# Patient Record
Sex: FEMALE | Race: WHITE | NOT HISPANIC OR LATINO | URBAN - METROPOLITAN AREA
[De-identification: names, ages, dates, MRNs, and addresses within clinical notes are randomized per-mention and may not be internally consistent; named-entity substitution may affect disease eponyms.]

---

## 2022-06-17 ENCOUNTER — TELEPHONE (OUTPATIENT)
Dept: SCHEDULING | Facility: CLINIC | Age: 57
End: 2022-06-17
Payer: COMMERCIAL

## 2022-06-17 NOTE — TELEPHONE ENCOUNTER
New Patient Appointment Request    Name of caller: Froy    Reason for Visit: high cholesterol    Insurance: Amanda    Insurance ID #: [id number]    Recent Procedures: none    Referred by: PCP Oral Rollins    Previous Cardiologist name and phone number: none    Best contact number: [phone]    Additional notes: would like appt towards the end of July

## 2022-09-12 NOTE — PROGRESS NOTES
Michael Hare MD  Cardiology    [facility]    [facility]  [facility] [address]  [address] [place] [address]    TEL  [phone]  [facility]     Advanced Lipid Clinic     09/14/22     Dear Dr. Rollins:    It was my pleasure to see Froy Saldaña in the Advanced Lipid Clinic today. She was referred for the evaluation and management of hypercholesterolemia.     As you know, she is a 56-year-old woman with a past medical history of hypercholesterolemia, diabetes mellitus, and hypothyroidism.    She reports that historically her cholesterol values have been reasonably controlled.  Following menopause her cholesterol values have started to increase and her glycemic control has worsened.  Following her most recent lab work which showed further elevation in her cholesterol, she met with a nutritionist and has made significant dietary changes.  She is exercising more frequently.  She reports that she has gained significant weight over the last several years.  She has never been on lipid-lowering therapy and prefers to avoid medication if at all possible.  She reports no new cardiopulmonary concerns or complaints.  No chest pain or pressure.  She has mild dyspnea with significant exertion which she relates to deconditioning.  No lower extremity edema, orthopnea, or PND.  No syncope.  She has no history of liver disease.  No history of preeclampsia or gestational diabetes.    Past Medical History: Hypothyroidism, prediabetes, hypercholesterolemia. Reports Lyme diagnosis at 41 years old.     Past Surgical History:  No surgery in the past. Colonoscopy at 50s.     Medications:  Current Outpatient Medications   Medication Sig Dispense Refill    FISH OIL-omega-3 fatty acids (FISH OIL) 340-1,000 mg capsule Take by mouth daily.      INOSITOL ORAL Take by mouth. 1-2 capsules twice daily      levothyroxine (SYNTHROID) 100 mcg tablet Take 100 mcg by mouth daily.       NOT IN DATABASE Met[employer] phytomulti      NOT IN DATABASE orthomolecular lipoic acid 1 capsule daily      NOT IN DATABASE orthomolecular lipoic reacted magnesium      soybean, fermented (NATTOKINASE ORAL) Take by mouth. 1 capsule daily      traZODone (DESYREL) 50 mg tablet Take 50 mg by mouth nightly.      ubidecarenone/vitamin E mixed (COQ10  ORAL) Take by mouth. 400 mg daily      VITAMIN B COMPLEX ORAL Take by mouth once daily.      VITAMIN K2 ORAL Take by mouth.       No current facility-administered medications for this visit.       Allergies: No medication allergy. Pollen allergy     Social History: She is currently working at her 's [employer] business.  Her last menstrual cycle was 4 years ago. She lives with her .  She has 3 children.  They grew up [place] [employer].  No smoking, alcohol, or illicit drugs.     Family History:   Father had MI at 63 and passed away at 70 years old. Mother has hypertension.   No known history of premature coronary artery disease.     Review of Systems: A complete 14-point review of systems is negative, except as noted in the HPI.    Exam:  Objective    Vitals:    09/14/22 0913   BP: 108/72   Pulse: 78   SpO2: 96%   Manual BP: 110/70mmHg    Constitutional: Appears comfortable.   Eyes: No icterus. No corneal arcus.   ENT: Deferred. Patient wearing a mask.  Neck: No jugular venous distention.   Vascular: No carotid bruits. Distal pulses intact and equal.  Cardiac: Normal S1 and S2, regular rate and rhythm. No murmurs, rubs, or gallops appreciated.  Lungs: Clear to auscultation bilaterally.   GI: Soft, normoactive bowel sounds.  Extremities: Warm. No lower extremity edema.   Skin: Dry.   Musculoskeletal: No joint swelling or erythema.  Neurologic: Awake, alert, oriented.    Psychiatric: No agitation.    Weight:  Wt Readings from Last 3 Encounters:   09/14/22 70.3 kg (155 lb)       Lipid panel: I personally reviewed the patient's  previous lipid results.  They were discussed with the patient.  7/2022:  TSH within normal limits    5/2022:  Sodium 137, potassium 4.2, BUN 23, creatinine 1.22    3/3/2022:  Sodium 138, potassium 4.0, BUN 20, creatinine 1.14  LFTs within normal limits  , TG 67, HDL 67,   Hemoglobin A1c 6.1%    11/2021:  Hemoglobin A1c 6.7%    7/2021:  , TG 50, HDL 72,     EKG: Tracing from today personally reviewed and discussed with the patient.  Sinus bradycardia at 56/min. No prior study available for comparison.        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  Historically she reports well-controlled cholesterol.  It is only recently that her values have increased.  Her LDL was 166 last year.  She has made significant lifestyle changes following her most recent lab values.  We discussed the role of statin therapy to reduce cardiovascular risk.  I reviewed the most recent cholesterol guidelines.  She has a strong preference to avoid medication.  Given her preferences on medication and historically reasonably controlled cholesterol we will obtain an advanced lipid panel as well as a coronary calcium score before making any decisions on pharmacotherapy.  We discussed continued lifestyle changes at length today.      Diabetes:  This is a recent diagnosis.  She has made significant lifestyle modifications recently.  She will follow-up with you for continued management.  Lipid-lowering as above.    Hypothyroidism   Most recent TSH within normal limits.  She continues to follow with you for management.    Chronic kidney disease  Recent diagnosis.  She will continue to follow with you for management.  Blood pressure is well controlled.      It was my pleasure to visit with Froy Saldaña in clinic today.  She will follow-up with me in 4 months.  Please do not hesitate to contact me with any questions.      Sincerely,          ____________  Michael Hare MD      Patient was seen with medical resident, Dr. Salazar.

## 2022-09-14 ENCOUNTER — OFFICE VISIT (OUTPATIENT)
Dept: CARDIOLOGY | Facility: CLINIC | Age: 57
End: 2022-09-14
Payer: COMMERCIAL

## 2022-09-14 VITALS
OXYGEN SATURATION: 96 % | BODY MASS INDEX: 25.83 KG/M2 | WEIGHT: 155 LBS | DIASTOLIC BLOOD PRESSURE: 72 MMHG | HEART RATE: 78 BPM | HEIGHT: 65 IN | SYSTOLIC BLOOD PRESSURE: 108 MMHG

## 2022-09-14 DIAGNOSIS — E03.9 HYPOTHYROIDISM, UNSPECIFIED TYPE: ICD-10-CM

## 2022-09-14 DIAGNOSIS — E78.00 HYPERCHOLESTEROLEMIA: Primary | ICD-10-CM

## 2022-09-14 DIAGNOSIS — N18.9 CHRONIC KIDNEY DISEASE, UNSPECIFIED CKD STAGE: ICD-10-CM

## 2022-09-14 DIAGNOSIS — E11.9 TYPE 2 DIABETES MELLITUS WITHOUT COMPLICATION, WITHOUT LONG-TERM CURRENT USE OF INSULIN (CMS/HCC): ICD-10-CM

## 2022-09-14 PROCEDURE — 93000 ELECTROCARDIOGRAM COMPLETE: CPT | Performed by: INTERNAL MEDICINE

## 2022-09-14 PROCEDURE — 3008F BODY MASS INDEX DOCD: CPT | Performed by: INTERNAL MEDICINE

## 2022-09-14 PROCEDURE — 99204 OFFICE O/P NEW MOD 45 MIN: CPT | Mod: GC | Performed by: INTERNAL MEDICINE

## 2022-09-14 RX ORDER — TRAZODONE HYDROCHLORIDE 50 MG/1
50 TABLET ORAL NIGHTLY
COMMUNITY
End: 2023-01-27

## 2022-09-14 RX ORDER — LEVOTHYROXINE SODIUM 100 UG/1
100 TABLET ORAL
COMMUNITY
End: 2023-01-27

## 2022-12-19 ENCOUNTER — APPOINTMENT (OUTPATIENT)
Dept: LAB | Age: 57
End: 2022-12-19
Attending: INTERNAL MEDICINE
Payer: COMMERCIAL

## 2022-12-19 ENCOUNTER — HOSPITAL ENCOUNTER (OUTPATIENT)
Dept: RADIOLOGY | Age: 57
Discharge: HOME | End: 2022-12-19
Attending: INTERNAL MEDICINE

## 2022-12-19 DIAGNOSIS — E78.00 PURE HYPERCHOLESTEROLEMIA, UNSPECIFIED: ICD-10-CM

## 2022-12-19 DIAGNOSIS — E78.5 HYPERLIPIDEMIA, UNSPECIFIED: ICD-10-CM

## 2022-12-19 DIAGNOSIS — E78.00 HYPERCHOLESTEROLEMIA: ICD-10-CM

## 2022-12-19 PROCEDURE — G1004 CDSM NDSC: HCPCS

## 2022-12-19 PROCEDURE — 30099997 SPECIMEN PROCESSING

## 2023-01-05 ENCOUNTER — DOCUMENTATION (OUTPATIENT)
Dept: CARDIOLOGY | Facility: CLINIC | Age: 58
End: 2023-01-05
Payer: COMMERCIAL

## 2023-01-09 LAB — HBA1C MFR BLD HPLC: 7.1 %

## 2023-01-27 ENCOUNTER — OFFICE VISIT (OUTPATIENT)
Dept: CARDIOLOGY | Facility: CLINIC | Age: 58
End: 2023-01-27
Payer: COMMERCIAL

## 2023-01-27 VITALS
SYSTOLIC BLOOD PRESSURE: 106 MMHG | HEIGHT: 64 IN | BODY MASS INDEX: 25.78 KG/M2 | DIASTOLIC BLOOD PRESSURE: 68 MMHG | HEART RATE: 68 BPM | RESPIRATION RATE: 18 BRPM | WEIGHT: 151 LBS | OXYGEN SATURATION: 98 %

## 2023-01-27 DIAGNOSIS — E78.00 HYPERCHOLESTEROLEMIA: Primary | ICD-10-CM

## 2023-01-27 DIAGNOSIS — E03.9 HYPOTHYROIDISM, UNSPECIFIED TYPE: ICD-10-CM

## 2023-01-27 DIAGNOSIS — E11.9 TYPE 2 DIABETES MELLITUS WITHOUT COMPLICATION, WITHOUT LONG-TERM CURRENT USE OF INSULIN (CMS/HCC): ICD-10-CM

## 2023-01-27 DIAGNOSIS — N18.9 CHRONIC KIDNEY DISEASE, UNSPECIFIED CKD STAGE: ICD-10-CM

## 2023-01-27 PROCEDURE — 99214 OFFICE O/P EST MOD 30 MIN: CPT | Performed by: INTERNAL MEDICINE

## 2023-01-27 PROCEDURE — 3008F BODY MASS INDEX DOCD: CPT | Performed by: INTERNAL MEDICINE

## 2023-01-27 NOTE — PROGRESS NOTES
Michael Hare MD  Cardiology    [facility]    [facility]  [facility] [address]  [address] [place] [address]    TEL  [phone]  [facility]     Advanced Lipid Clinic     01/27/23     Dear Dr. Rollins:    It was my pleasure to see Froy Saldaña in the Advanced Lipid Clinic today.  She is accompanied by her  who provided additional history.    As you know, she is a 57-year-old woman with a past medical history of hypercholesterolemia, diabetes mellitus, and hypothyroidism.    She has done well overall since her last visit with me.  She has made significant dietary changes.  She exercises regularly.  She continues to deny any cardiopulmonary symptoms at rest or with activity.  Much of today's visit was spent reviewing her advanced lipid panel as well as her calcium score.  She has no specific concerns or complaints in the office.    Past Medical History: Hypothyroidism, diabetes, hypercholesterolemia. Reports Lyme diagnosis at 41 years old.     Past Surgical History:  No surgery in the past. Colonoscopy at 50s.     Medications:  Current Outpatient Medications   Medication Sig Dispense Refill   • FISH OIL-omega-3 fatty acids (FISH OIL) 340-1,000 mg capsule Take by mouth daily.     • INOSITOL ORAL Take by mouth. 1-2 capsules twice daily     • NOT IN DATABASE Metagenics phytomulti     • NOT IN DATABASE orthomolecular lipoic acid 1 capsule daily     • NOT IN DATABASE orthomolecular lipoic reacted magnesium     • soybean, fermented (NATTOKINASE ORAL) Take by mouth. 1 capsule daily     • ubidecarenone/vitamin E mixed (COQ10  ORAL) Take by mouth. 400 mg daily     • VITAMIN B COMPLEX ORAL Take by mouth once daily.     • VITAMIN K2 ORAL Take by mouth.       No current facility-administered medications for this visit.       Allergies: No medication allergy. 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  Her last menstrual cycle was 4 years ago. She lives with her .  She has 3 children.  They grew up skiing at [employer].  No smoking, alcohol, or illicit drugs.     Family History:   Father had MI at 63 and passed away at 70 years old. Mother has hypertension.   No known history of premature coronary artery disease.     Review of Systems: A complete 14-point review of systems is negative, except as noted in the HPI.    Exam:  Objective    Vitals:    01/27/23 0917   BP: 106/68   Pulse: 68   Resp: 18   SpO2: 98%     Constitutional: Appears comfortable.   Eyes: No icterus. No corneal arcus.   ENT: Deferred. Patient wearing a mask.  Neck: No jugular venous distention.   Vascular: No carotid bruits.  Cardiac: Normal S1 and S2, regular rate and rhythm. No murmurs, rubs, or gallops appreciated.  Lungs: Clear to auscultation bilaterally.   GI: Soft, normoactive bowel sounds.  Extremities: Warm. No lower extremity edema.   Skin: Dry.   Neurologic: Awake, alert, oriented.    Psychiatric: No agitation.    Weight:  Wt Readings from Last 3 Encounters:   01/27/23 68.5 kg (151 lb)   09/14/22 70.3 kg (155 lb)       Lipid panel: I personally reviewed the patient's previous lipid results.  They were discussed with the patient.  12/2022:  , TG 50, HDL, 86,   ApoB 102  Lp(a) <15  Borderline production and high absorption  hsCRP 4.2  A1c 7.2%  LDL-P 1475, LDL size 20.8  Fatty acide index 9.28    7/2022:  TSH within normal limits    5/2022:  Sodium 137, potassium 4.2, BUN 23, creatinine 1.22    3/3/2022:  Sodium 138, potassium 4.0, BUN 20, creatinine 1.14  LFTs within normal limits  , TG 67, HDL 67,   Hemoglobin A1c 6.1%    11/2021:  Hemoglobin A1c 6.7%    7/2021:  , TG 50, HDL 72,     Pertinent Studies:  · CAC, 12/2022: Composite 0, aorta unremarkable.    Impressions and Recommendations:     Hypercholesterolemia:  Ms. Saldaña's advanced cholesterol testing was reviewed  in detail.  We discussed the coronary calcium score data in diabetic patients.  I reviewed that by guidelines statin therapy should be prescribed to reduce her long-term cardiovascular risk.  This was still my recommendation.  She would like to think about this further before making any decisions.  She will follow-up with me once she has made a decision.  If she decides not to proceed with statin therapy I would plan to repeat her calcium score in 3 years.  She will continue to work on lifestyle change.    Diabetes:  She has made significant lifestyle modifications.  She will follow-up with you for continued management.  Lipid-lowering as above.    Hypothyroidism   She continues to follow with you for management.    Chronic kidney disease  She will continue to follow with you for management.  Blood pressure is well controlled.      It was my pleasure to visit with Froy Saldaña in clinic today.  She will follow-up with me in 12 months.  Please do not hesitate to contact me with any questions.      Sincerely,         ____________  Michael Hare MD    I spent 30 minutes on this date of service performing the following activities: obtaining history, performing examination, documenting, preparing for visit, obtaining / reviewing records and providing counseling and education.

## 2023-07-19 ENCOUNTER — OFFICE VISIT (OUTPATIENT)
Dept: GASTROENTEROLOGY | Facility: CLINIC | Age: 58
End: 2023-07-19
Payer: COMMERCIAL

## 2023-07-19 ENCOUNTER — TELEPHONE (OUTPATIENT)
Dept: GASTROENTEROLOGY | Facility: CLINIC | Age: 58
End: 2023-07-19

## 2023-07-19 VITALS
SYSTOLIC BLOOD PRESSURE: 118 MMHG | HEIGHT: 64 IN | BODY MASS INDEX: 24.24 KG/M2 | WEIGHT: 142 LBS | DIASTOLIC BLOOD PRESSURE: 80 MMHG

## 2023-07-19 DIAGNOSIS — R05.3 CHRONIC COUGH: Primary | ICD-10-CM

## 2023-07-19 DIAGNOSIS — Z12.11 SCREENING FOR COLON CANCER: ICD-10-CM

## 2023-07-19 PROCEDURE — 99214 OFFICE O/P EST MOD 30 MIN: CPT | Performed by: INTERNAL MEDICINE

## 2023-07-19 RX ORDER — COLESEVELAM 180 1/1
TABLET ORAL
COMMUNITY
Start: 2023-06-19

## 2023-07-19 RX ORDER — PERPHENAZINE 16 MG/1
TABLET, FILM COATED ORAL DAILY
COMMUNITY
Start: 2023-05-17

## 2023-07-19 RX ORDER — TRAZODONE HYDROCHLORIDE 50 MG/1
TABLET ORAL
COMMUNITY
Start: 2023-07-08

## 2023-07-19 RX ORDER — LANCETS
EACH MISCELLANEOUS
COMMUNITY
Start: 2023-05-17

## 2023-07-19 RX ORDER — CALCIUM CARBONATE/VITAMIN D3 500-10/5ML
LIQUID (ML) ORAL 3 TIMES DAILY
COMMUNITY

## 2023-07-19 RX ORDER — LEVOTHYROXINE SODIUM 112 UG/1
112 TABLET ORAL DAILY
COMMUNITY
Start: 2023-04-20

## 2023-07-19 NOTE — PROGRESS NOTES
[facility] - Outpatient Follow-up Note  Dedrick Maxwell 56 y.o. female MRN: [id number]  Encounter: [id number]    ASSESSMENT AND PLAN:      1. Chronic cough  About 1 year of gagging and coughing occurring after brushing her teeth. Does not occur while brushing, only happens when she tries to spit toothpaste  Intermittent issues with cough when swallowing water  Reviewed differential diagnosis which includes extra- esophageal manifestation of reflux, Zenker's diverticulum, dysmotility, oropharyngeal dysphagia. Reviewed treatment options. We will start with upper endoscopy. If unrevealing we discussed barium swallow , speech therapy evaluation, or motility testing. 2. Screening for colon cancer  Hyperplastic polyp January 2018, 10-year recall    Followup Appointment: Pending EGD  ______________________________________________________________________    Chief Complaint   Patient presents with   • gagging/coughing     Primarily occurs when pt is finished brushing her teeth she will gag and cough     HPI: The patient presents accompanied by her daughter with complaint of gagging and coughing present for about 1 year. Occurs after brushing her teeth when trying to spit out the toothpaste. It occurs every time but is more pronounced in the morning. She intermittently has issues with water "going down the wrong pipe" which predates this coughing. She recently lost about 20 pounds intentionally due to elevated cholesterol and fasting sugar. With this weight loss her GI complaints are less pronounced. She denies any celeste dysphagia to solids. She denies any classic reflux complaints. She has no nausea or vomiting. She has no lower GI complaints.     Historical Information   Past Medical History:   Diagnosis Date   • Lyme disease      Past Surgical History:   Procedure Laterality Date   • COLONOSCOPY       Social History     Substance and Sexual Activity   Alcohol Use None Social History     Substance and Sexual Activity   Drug Use Not on file     Social History     Tobacco Use   Smoking Status Never   Smokeless Tobacco Never     Family History   Problem Relation Age of Onset   • No Known Problems Mother    • No Known Problems Father    • No Known Problems Sister    • No Known Problems Brother          Current Outpatient Medications:   •  colesevelam (WELCHOL) 625 mg tablet  •  Contour Next Test test strip  •  levothyroxine 112 mcg tablet  •  Magnesium Oxide 400 MG CAPS  •  Microlet Lancets MISC  •  traZODone (DESYREL) 50 mg tablet  No Known Allergies  Reviewed medications and allergies and updated as indicated    PHYSICAL EXAM:    Blood pressure 118/80, height 5' 4" (1.626 m), weight 64.4 kg (142 lb). Body mass index is 24.37 kg/m². General Appearance: NAD, cooperative, alert  Eyes: Anicteric, PERRLA, EOMI  ENT:  Normocephalic, atraumatic, normal mucosa. Neck:  Supple, symmetrical, trachea midline  Resp:  Clear to auscultation bilaterally; no rales, rhonchi or wheezing; respirations unlabored   CV:  S1 S2, Regular rate and rhythm; no murmur, rub, or gallop. GI:  Soft, non-tender, non-distended; normal bowel sounds; no masses, no organomegaly   Rectal: Deferred  Musculoskeletal: No cyanosis, clubbing or edema. Normal ROM. Skin:  No jaundice, rashes, or lesions   Heme/Lymph: No palpable cervical lymphadenopathy  Psych: Normal affect, good eye contact  Neuro: No gross deficits, AAOx3    Lab Results:   No results found for: "WBC", "HGB", "HCT", "MCV", "PLT"  No results found for: "NA", "K", "CL", "CO2", "ANIONGAP", "BUN", "CREATININE", "GLUCOSE", "GLUF", "CALCIUM", "CORRECTEDCA", "AST", "ALT", "ALKPHOS", "PROT", "BILITOT", "EGFR"  No results found for: "IRON", "TIBC", "FERRITIN"  No results found for: "LIPASE"    Radiology Results:   No results found.

## 2023-07-19 NOTE — TELEPHONE ENCOUNTER
Scheduled date of EGD(as of today):8-16-23  Physician performing EGD:Emre  Location of EGD:[facility]  Instructions reviewed with patient by:AVE  Clearances: no

## 2023-07-19 NOTE — H&P (VIEW-ONLY)
[facility] - Outpatient Follow-up Note  Roxann Sprague 56 y.o. female MRN: [id number]  Encounter: [id number]    ASSESSMENT AND PLAN:      1. Chronic cough  About 1 year of gagging and coughing occurring after brushing her teeth. Does not occur while brushing, only happens when she tries to spit toothpaste  Intermittent issues with cough when swallowing water  Reviewed differential diagnosis which includes extra- esophageal manifestation of reflux, Zenker's diverticulum, dysmotility, oropharyngeal dysphagia. Reviewed treatment options. We will start with upper endoscopy. If unrevealing we discussed barium swallow , speech therapy evaluation, or motility testing. 2. Screening for colon cancer  Hyperplastic polyp January 2018, 10-year recall    Followup Appointment: Pending EGD  ______________________________________________________________________    Chief Complaint   Patient presents with   • gagging/coughing     Primarily occurs when pt is finished brushing her teeth she will gag and cough     HPI: The patient presents accompanied by her daughter with complaint of gagging and coughing present for about 1 year. Occurs after brushing her teeth when trying to spit out the toothpaste. It occurs every time but is more pronounced in the morning. She intermittently has issues with water "going down the wrong pipe" which predates this coughing. She recently lost about 20 pounds intentionally due to elevated cholesterol and fasting sugar. With this weight loss her GI complaints are less pronounced. She denies any celeste dysphagia to solids. She denies any classic reflux complaints. She has no nausea or vomiting. She has no lower GI complaints.     Historical Information   Past Medical History:   Diagnosis Date   • Lyme disease      Past Surgical History:   Procedure Laterality Date   • COLONOSCOPY       Social History     Substance and Sexual Activity   Alcohol Use None Social History     Substance and Sexual Activity   Drug Use Not on file     Social History     Tobacco Use   Smoking Status Never   Smokeless Tobacco Never     Family History   Problem Relation Age of Onset   • No Known Problems Mother    • No Known Problems Father    • No Known Problems Sister    • No Known Problems Brother          Current Outpatient Medications:   •  colesevelam (WELCHOL) 625 mg tablet  •  Contour Next Test test strip  •  levothyroxine 112 mcg tablet  •  Magnesium Oxide 400 MG CAPS  •  Microlet Lancets MISC  •  traZODone (DESYREL) 50 mg tablet  No Known Allergies  Reviewed medications and allergies and updated as indicated    PHYSICAL EXAM:    Blood pressure 118/80, height 5' 4" (1.626 m), weight 64.4 kg (142 lb). Body mass index is 24.37 kg/m². General Appearance: NAD, cooperative, alert  Eyes: Anicteric, PERRLA, EOMI  ENT:  Normocephalic, atraumatic, normal mucosa. Neck:  Supple, symmetrical, trachea midline  Resp:  Clear to auscultation bilaterally; no rales, rhonchi or wheezing; respirations unlabored   CV:  S1 S2, Regular rate and rhythm; no murmur, rub, or gallop. GI:  Soft, non-tender, non-distended; normal bowel sounds; no masses, no organomegaly   Rectal: Deferred  Musculoskeletal: No cyanosis, clubbing or edema. Normal ROM. Skin:  No jaundice, rashes, or lesions   Heme/Lymph: No palpable cervical lymphadenopathy  Psych: Normal affect, good eye contact  Neuro: No gross deficits, AAOx3    Lab Results:   No results found for: "WBC", "HGB", "HCT", "MCV", "PLT"  No results found for: "NA", "K", "CL", "CO2", "ANIONGAP", "BUN", "CREATININE", "GLUCOSE", "GLUF", "CALCIUM", "CORRECTEDCA", "AST", "ALT", "ALKPHOS", "PROT", "BILITOT", "EGFR"  No results found for: "IRON", "TIBC", "FERRITIN"  No results found for: "LIPASE"    Radiology Results:   No results found.

## 2023-08-01 ENCOUNTER — TELEPHONE (OUTPATIENT)
Dept: GASTROENTEROLOGY | Facility: CLINIC | Age: 58
End: 2023-08-01

## 2023-08-01 NOTE — TELEPHONE ENCOUNTER
Procedure confirmed  Endoscopy     Via: Voice mail    Instructions given: Given to Patient at Visit     Prep Given: N/A    Call the office if there are any questions.

## 2023-08-15 ENCOUNTER — TELEPHONE (OUTPATIENT)
Dept: GASTROENTEROLOGY | Facility: AMBULATORY SURGERY CENTER | Age: 58
End: 2023-08-15

## 2023-08-16 ENCOUNTER — ANESTHESIA EVENT (OUTPATIENT)
Dept: GASTROENTEROLOGY | Facility: AMBULATORY SURGERY CENTER | Age: 58
End: 2023-08-16

## 2023-08-16 ENCOUNTER — HOSPITAL ENCOUNTER (OUTPATIENT)
Dept: GASTROENTEROLOGY | Facility: AMBULATORY SURGERY CENTER | Age: 58
Discharge: HOME | End: 2023-08-16
Payer: COMMERCIAL

## 2023-08-16 ENCOUNTER — ANESTHESIA (OUTPATIENT)
Dept: GASTROENTEROLOGY | Facility: AMBULATORY SURGERY CENTER | Age: 58
End: 2023-08-16

## 2023-08-16 VITALS
HEART RATE: 57 BPM | WEIGHT: 142 LBS | BODY MASS INDEX: 24.24 KG/M2 | HEIGHT: 64 IN | SYSTOLIC BLOOD PRESSURE: 100 MMHG | RESPIRATION RATE: 15 BRPM | OXYGEN SATURATION: 97 % | TEMPERATURE: 98.6 F | DIASTOLIC BLOOD PRESSURE: 57 MMHG

## 2023-08-16 DIAGNOSIS — R05.3 CHRONIC COUGH: ICD-10-CM

## 2023-08-16 DIAGNOSIS — K29.70 GASTRITIS DETERMINED BY ENDOSCOPY: Primary | ICD-10-CM

## 2023-08-16 PROCEDURE — 43450 DILATE ESOPHAGUS 1/MULT PASS: CPT | Performed by: INTERNAL MEDICINE

## 2023-08-16 PROCEDURE — 43239 EGD BIOPSY SINGLE/MULTIPLE: CPT | Performed by: INTERNAL MEDICINE

## 2023-08-16 PROCEDURE — 88305 TISSUE EXAM BY PATHOLOGIST: CPT | Performed by: PATHOLOGY

## 2023-08-16 RX ORDER — SODIUM CHLORIDE, SODIUM LACTATE, POTASSIUM CHLORIDE, CALCIUM CHLORIDE 600; 310; 30; 20 MG/100ML; MG/100ML; MG/100ML; MG/100ML
50 INJECTION, SOLUTION INTRAVENOUS CONTINUOUS
Status: DISCONTINUED | OUTPATIENT
Start: 2023-08-16 | End: 2023-08-16

## 2023-08-16 RX ORDER — OMEPRAZOLE 40 MG/1
40 CAPSULE, DELAYED RELEASE ORAL DAILY
Qty: 30 CAPSULE | Refills: 0 | Status: SHIPPED | OUTPATIENT
Start: 2023-08-16 | End: 2023-08-31

## 2023-08-16 RX ORDER — PROPOFOL 10 MG/ML
INJECTION, EMULSION INTRAVENOUS AS NEEDED
Status: DISCONTINUED | OUTPATIENT
Start: 2023-08-16 | End: 2023-08-16

## 2023-08-16 RX ORDER — LIDOCAINE HYDROCHLORIDE 20 MG/ML
INJECTION, SOLUTION EPIDURAL; INFILTRATION; INTRACAUDAL; PERINEURAL AS NEEDED
Status: DISCONTINUED | OUTPATIENT
Start: 2023-08-16 | End: 2023-08-16

## 2023-08-16 RX ORDER — SODIUM CHLORIDE, SODIUM LACTATE, POTASSIUM CHLORIDE, CALCIUM CHLORIDE 600; 310; 30; 20 MG/100ML; MG/100ML; MG/100ML; MG/100ML
INJECTION, SOLUTION INTRAVENOUS CONTINUOUS PRN
Status: DISCONTINUED | OUTPATIENT
Start: 2023-08-16 | End: 2023-08-16

## 2023-08-16 RX ADMIN — SODIUM CHLORIDE, SODIUM LACTATE, POTASSIUM CHLORIDE, CALCIUM CHLORIDE: 600; 310; 30; 20 INJECTION, SOLUTION INTRAVENOUS at 08:25

## 2023-08-16 RX ADMIN — SODIUM CHLORIDE, SODIUM LACTATE, POTASSIUM CHLORIDE, CALCIUM CHLORIDE 50 ML/HR: 600; 310; 30; 20 INJECTION, SOLUTION INTRAVENOUS at 08:20

## 2023-08-16 RX ADMIN — PROPOFOL 20 MG: 10 INJECTION, EMULSION INTRAVENOUS at 08:28

## 2023-08-16 RX ADMIN — LIDOCAINE HYDROCHLORIDE 100 MG: 20 INJECTION, SOLUTION EPIDURAL; INFILTRATION; INTRACAUDAL; PERINEURAL at 08:25

## 2023-08-16 RX ADMIN — PROPOFOL 100 MG: 10 INJECTION, EMULSION INTRAVENOUS at 08:25

## 2023-08-16 NOTE — ANESTHESIA PREPROCEDURE EVALUATION
Procedure:  EGD    Relevant Problems   No relevant active problems        Physical Exam    Airway    Mallampati score: II  TM Distance: >3 FB  Neck ROM: full     Dental   No notable dental hx     Cardiovascular      Pulmonary      Other Findings        Anesthesia Plan  ASA Score- 1     Anesthesia Type- IV sedation with anesthesia with ASA Monitors. Additional Monitors:   Airway Plan:           Plan Factors-    Chart reviewed. Patient summary reviewed. Patient is not a current smoker. Induction- intravenous. Postoperative Plan-     Informed Consent- Anesthetic plan and risks discussed with patient. I personally reviewed this patient with the CRNA. Discussed and agreed on the Anesthesia Plan with the CRNA. Joann Lucia

## 2023-08-16 NOTE — INTERVAL H&P NOTE
H&P reviewed. After examining the patient I find no changes in the patients condition since the H&P had been written.     Vitals:    08/16/23 0811   BP: 98/54   Pulse: 57   Resp: 22   Temp: 98.6 °F (37 °C)   SpO2: 96%

## 2023-08-16 NOTE — ANESTHESIA POSTPROCEDURE EVALUATION
Post-Op Assessment Note    CV Status:  Stable  Pain Score: 0    Pain management: adequate     Mental Status:  Alert and awake   Hydration Status:  Euvolemic   PONV Controlled:  Controlled   Airway Patency:  Patent      Post Op Vitals Reviewed: Yes      Staff: Anesthesiologist, CRNA         There were no known notable events for this encounter.     /59 (08/16/23 0834)    Temp      Pulse (!) 53 (08/16/23 0834)   Resp 15 (08/16/23 0834)    SpO2 96 % (08/16/23 0834)

## 2023-08-24 NOTE — RESULT ENCOUNTER NOTE
Discussed with patient, no EGD recall  Doing better on PPI and after dilatation. We will continue for 30 days.   Requested call back if symptoms recur

## 2023-08-31 DIAGNOSIS — K29.70 GASTRITIS DETERMINED BY ENDOSCOPY: ICD-10-CM

## 2023-08-31 DIAGNOSIS — R05.3 CHRONIC COUGH: ICD-10-CM

## 2023-08-31 RX ORDER — OMEPRAZOLE 40 MG/1
40 CAPSULE, DELAYED RELEASE ORAL DAILY
Qty: 30 CAPSULE | Refills: 0 | Status: SHIPPED | OUTPATIENT
Start: 2023-08-31

## 2024-02-09 ENCOUNTER — OFFICE VISIT (OUTPATIENT)
Dept: CARDIOLOGY | Facility: CLINIC | Age: 59
End: 2024-02-09
Payer: COMMERCIAL

## 2024-02-09 VITALS
WEIGHT: 151 LBS | SYSTOLIC BLOOD PRESSURE: 112 MMHG | OXYGEN SATURATION: 96 % | DIASTOLIC BLOOD PRESSURE: 64 MMHG | HEART RATE: 64 BPM | BODY MASS INDEX: 25.92 KG/M2

## 2024-02-09 DIAGNOSIS — N18.9 CHRONIC KIDNEY DISEASE, UNSPECIFIED CKD STAGE: ICD-10-CM

## 2024-02-09 DIAGNOSIS — E11.9 TYPE 2 DIABETES MELLITUS WITHOUT COMPLICATION, WITHOUT LONG-TERM CURRENT USE OF INSULIN (CMS/HCC): ICD-10-CM

## 2024-02-09 DIAGNOSIS — E03.9 HYPOTHYROIDISM, UNSPECIFIED TYPE: ICD-10-CM

## 2024-02-09 DIAGNOSIS — E78.00 HYPERCHOLESTEROLEMIA: Primary | ICD-10-CM

## 2024-02-09 PROCEDURE — 3008F BODY MASS INDEX DOCD: CPT | Performed by: INTERNAL MEDICINE

## 2024-02-09 PROCEDURE — 93000 ELECTROCARDIOGRAM COMPLETE: CPT | Performed by: INTERNAL MEDICINE

## 2024-02-09 PROCEDURE — 99213 OFFICE O/P EST LOW 20 MIN: CPT | Performed by: INTERNAL MEDICINE

## 2024-02-09 NOTE — PROGRESS NOTES
Michael Hare MD  Cardiology    [facility]    [facility]  [facility] [address]  [address] [place] [address]    TEL  [phone]  [facility]     [facility]     02/09/24     Dear Dr. Rollins:    It was my pleasure to see Froy Saldaña in the [facility] today.  She is accompanied by her  who provided additional history.    As you know, she is a 58-year-old woman with a past medical history of hypercholesterolemia, diabetes mellitus, and hypothyroidism.    She has done well overall since her last visit with me.  Less routine exercise recently due to an illness following the COVID vaccine. She has gained weight. She continues to deny any cardiopulmonary symptoms at rest or with activity.  She has no specific concerns or complaints in the office.    Past Medical History: Hypothyroidism, diabetes, hypercholesterolemia. Reports Lyme diagnosis at 41 years old.     Past Surgical History:  No surgery in the past. Colonoscopy at 50s.     Medications:  Current Outpatient Medications   Medication Sig Dispense Refill   • FISH OIL-omega-3 fatty acids (FISH OIL) 340-1,000 mg capsule Take by mouth daily.     • INOSITOL ORAL Take by mouth. 1-2 capsules twice daily     • NOT IN DATABASE Metagenics phytomulti     • NOT IN DATABASE orthomolecular lipoic acid 1 capsule daily     • NOT IN DATABASE orthomolecular lipoic reacted magnesium     • soybean, fermented (NATTOKINASE ORAL) Take by mouth. 1 capsule daily     • ubidecarenone/vitamin E mixed (COQ10  ORAL) Take by mouth. 400 mg daily     • VITAMIN B COMPLEX ORAL Take by mouth once daily.     • VITAMIN K2 ORAL Take by mouth.       No current facility-administered medications for this visit.       Allergies: No medication allergy. Pollen allergy     Social History: She is currently working at her 's [email or web address] business.  She lives with her .  She has 3  children.  They grew up skiing at [place] [place].  No smoking, alcohol, or illicit drugs.     Family History:   Father had MI at 63 and passed away at 70 years old. Mother has hypertension.   No known history of premature coronary artery disease.     Review of Systems: A complete 14-point review of systems is negative, except as noted in the HPI.    Exam:  Objective    Vitals:    02/09/24 0826   BP: 112/64   Pulse: 64   SpO2: 96%     Constitutional: Appears comfortable.   Eyes: No icterus.   ENT: Deferred. Patient wearing a mask.  Neck: No jugular venous distention.   Vascular: No carotid bruits.  Cardiac: Normal S1 and S2, regular rate and rhythm. No murmurs, rubs, or gallops appreciated.  Lungs: Clear to auscultation bilaterally.   GI: Soft, normoactive bowel sounds.  Extremities: Warm. No lower extremity edema.   Skin: Dry.   Neurologic: Awake, alert, oriented.    Psychiatric: No agitation.    Weight:  Wt Readings from Last 3 Encounters:   02/09/24 68.5 kg (151 lb)   01/27/23 68.5 kg (151 lb)   09/14/22 70.3 kg (155 lb)     Lipid panel: I personally reviewed the patient's previous lipid results.  They were discussed with the patient.  12/2022:  , TG 50, HDL, 86,   ApoB 102  Lp(a) <15  Borderline production and high absorption  hsCRP 4.2  A1c 7.2%  LDL-P 1475, LDL size 20.8  Fatty acide index 9.28    7/2022:  TSH within normal limits    5/2022:  Sodium 137, potassium 4.2, BUN 23, creatinine 1.22    3/3/2022:  Sodium 138, potassium 4.0, BUN 20, creatinine 1.14  LFTs within normal limits  , TG 67, HDL 67,   Hemoglobin A1c 6.1%    11/2021:  Hemoglobin A1c 6.7%    7/2021:  , TG 50, HDL 72,     ECG: Tracing from today personally reviewed and discussed with the patient. Reveals sinus rhythm.    Pertinent Studies:  · CAC, 12/2022: Composite 0, aorta unremarkable.      Impressions and Recommendations:     Hypercholesterolemia:  Today I again reviewed that by guidelines statin therapy  should be prescribed to reduce her long-term cardiovascular risk.  This was again my recommendation.  Understanding the risk/benefit, she declines today. If she decides not to proceed with statin therapy I would plan to repeat her calcium score in 3 years.  She will continue to work on lifestyle change. Lipids will be obtained through your office.     Diabetes:  She will follow-up with you for continued management.  Lipid-lowering as above.    Hypothyroidism   She continues to follow with you for management.    Chronic kidney disease  She will continue to follow with you for management.  Blood pressure is well controlled.      It was my pleasure to visit with Froy Saldaña in clinic today.  She will follow-up with me in 12 months.  Please do not hesitate to contact me with any questions.      Sincerely,         ____________  Michael Hare MD    .

## 2025-02-06 ENCOUNTER — TELEPHONE (OUTPATIENT)
Dept: SCHEDULING | Facility: CLINIC | Age: 60
End: 2025-02-06
Payer: COMMERCIAL

## 2025-02-06 DIAGNOSIS — E78.00 HYPERCHOLESTEROLEMIA: Primary | ICD-10-CM

## 2025-02-06 NOTE — TELEPHONE ENCOUNTER
Pt would like to know if she needs to complete labs before her visit with Dr Hare. NO outstanding lab orders in her chart. Please advise.   Pt uses [facility] if labs need to be completed.   Pt can be reached at [phone]

## 2025-02-06 NOTE — TELEPHONE ENCOUNTER
Hi Tien- can you please check [facility] for any additional lab work earlier this year? There is an A1c scanned into the chart but no other labs.  Thank you.

## 2025-02-06 NOTE — TELEPHONE ENCOUNTER
Please see prior.  Last OV 2/2024.  EK notes reflect declines statin therapy at that time.  Noted PCP to check lipids.  Lipids done in April 2024.  Do you want any labs at this time?  Please advise.  Thank you.

## 2025-02-07 NOTE — TELEPHONE ENCOUNTER
Called and left voicemail stating that there are fasting labs ordered and to be completed prior to the appt with Dr Hare on 2/14/25

## 2025-02-08 LAB
ALBUMIN SERPL-MCNC: 4.2 G/DL (ref 3.8–4.9)
ALP SERPL-CCNC: 75 IU/L (ref 44–121)
ALT SERPL-CCNC: 16 IU/L (ref 0–32)
AST SERPL-CCNC: 15 IU/L (ref 0–40)
BILIRUB SERPL-MCNC: 0.4 MG/DL (ref 0–1.2)
BUN SERPL-MCNC: 18 MG/DL (ref 6–24)
BUN/CREAT SERPL: 19 (ref 9–23)
CALCIUM SERPL-MCNC: 9.4 MG/DL (ref 8.7–10.2)
CHLORIDE SERPL-SCNC: 105 MMOL/L (ref 96–106)
CHOLEST SERPL-MCNC: 204 MG/DL (ref 100–199)
CO2 SERPL-SCNC: 22 MMOL/L (ref 20–29)
CREAT SERPL-MCNC: 0.93 MG/DL (ref 0.57–1)
EGFRCR SERPLBLD CKD-EPI 2021: 72 ML/MIN/1.73
GLOBULIN SER CALC-MCNC: 2.3 G/DL (ref 1.5–4.5)
GLUCOSE SERPL-MCNC: 144 MG/DL (ref 70–99)
HDLC SERPL-MCNC: 52 MG/DL
LDLC SERPL CALC-MCNC: 136 MG/DL (ref 0–99)
POTASSIUM SERPL-SCNC: 4.3 MMOL/L (ref 3.5–5.2)
PROT SERPL-MCNC: 6.5 G/DL (ref 6–8.5)
SODIUM SERPL-SCNC: 141 MMOL/L (ref 134–144)
TRIGL SERPL-MCNC: 88 MG/DL (ref 0–149)
VLDLC SERPL CALC-MCNC: 16 MG/DL (ref 5–40)

## 2025-02-14 ENCOUNTER — OFFICE VISIT (OUTPATIENT)
Dept: CARDIOLOGY | Facility: CLINIC | Age: 60
End: 2025-02-14
Payer: COMMERCIAL

## 2025-02-14 VITALS
OXYGEN SATURATION: 97 % | DIASTOLIC BLOOD PRESSURE: 74 MMHG | BODY MASS INDEX: 24.07 KG/M2 | WEIGHT: 141 LBS | HEART RATE: 74 BPM | SYSTOLIC BLOOD PRESSURE: 120 MMHG | HEIGHT: 64 IN

## 2025-02-14 DIAGNOSIS — E03.9 HYPOTHYROIDISM, UNSPECIFIED TYPE: ICD-10-CM

## 2025-02-14 DIAGNOSIS — Z82.49 FAMILY HISTORY OF CORONARY ARTERY DISEASE: ICD-10-CM

## 2025-02-14 DIAGNOSIS — R00.0 HEART RATE FAST: ICD-10-CM

## 2025-02-14 DIAGNOSIS — E11.9 TYPE 2 DIABETES MELLITUS WITHOUT COMPLICATION, WITHOUT LONG-TERM CURRENT USE OF INSULIN (CMS/HCC): ICD-10-CM

## 2025-02-14 DIAGNOSIS — N18.9 CHRONIC KIDNEY DISEASE, UNSPECIFIED CKD STAGE: ICD-10-CM

## 2025-02-14 DIAGNOSIS — E78.00 HYPERCHOLESTEROLEMIA: Primary | ICD-10-CM

## 2025-02-14 LAB
ATRIAL RATE: 61
P AXIS: 65
PR INTERVAL: 120
QRS DURATION: 76
QT INTERVAL: 432
QTC CALCULATION(BAZETT): 434
R AXIS: 68
T WAVE AXIS: 60
VENTRICULAR RATE: 61

## 2025-02-14 PROCEDURE — 93000 ELECTROCARDIOGRAM COMPLETE: CPT | Performed by: INTERNAL MEDICINE

## 2025-02-14 PROCEDURE — 99214 OFFICE O/P EST MOD 30 MIN: CPT | Performed by: INTERNAL MEDICINE

## 2025-02-14 PROCEDURE — 3008F BODY MASS INDEX DOCD: CPT | Performed by: INTERNAL MEDICINE

## 2025-02-14 RX ORDER — BLOOD-GLUCOSE SENSOR
EACH MISCELLANEOUS
COMMUNITY
Start: 2025-02-10

## 2025-02-14 RX ORDER — METFORMIN HYDROCHLORIDE 750 MG/1
TABLET, EXTENDED RELEASE ORAL
COMMUNITY
Start: 2025-02-06

## 2025-02-14 NOTE — PROGRESS NOTES
Michael Hare MD  Cardiology    [facility]    [facility]  [facility] [address]  [address] [place] [address]    TEL  [phone]  [facility]     Advanced Lipid Clinic     02/14/25     Dear Dr. Rollins:    It was my pleasure to see Froy Saldaña in the [facility] today.  She is accompanied by her  who provided additional history.    As you know, she is a 59-year-old woman with a past medical history of hypercholesterolemia, diabetes mellitus, and hypothyroidism.    She has done well overall since her last visit.  Overall she feels relatively deconditioned compared with her prior baseline.  In this context she has experienced the sensation of a racing heart when she does more strenuous activity.  This resolves with rest.  She does not have the sensation in any other context such as with routine activities.  No palpitations.  No syncope.  No chest pain, dyspnea, or lower extremity edema.    Past Medical History: Hypothyroidism, diabetes, hypercholesterolemia. Reports Lyme diagnosis at 41 years old.     Past Surgical History:  No surgery in the past. Colonoscopy at 50s.     Medications:  Current Outpatient Medications   Medication Sig Dispense Refill    DEXCOM G7 SENSOR device device       FISH OIL-omega-3 fatty acids (FISH OIL) 340-1,000 mg capsule Take by mouth daily.      INOSITOL ORAL Take by mouth. 1-2 capsules twice daily      metFORMIN XR (GLUCOPHAGE-XR) 750 mg 24 hr tablet       VITAMIN B COMPLEX ORAL Take by mouth once daily.      VITAMIN K2 ORAL Take by mouth.      NOT IN DATABASE Metagenics phytomulti      NOT IN DATABASE orthomolecular lipoic acid 1 capsule daily      NOT IN DATABASE orthomolecular lipoic reacted magnesium      soybean, fermented (NATTOKINASE ORAL) Take by mouth. 1 capsule daily      ubidecarenone/vitamin E mixed (COQ10  ORAL) Take by mouth. 400 mg daily (Patient not taking: Reported on 2/14/2025)        No current facility-administered medications for this visit.       Allergies: No medication allergy. Pollen allergy     Social History: She is currently working at her 's [employer] business.  She lives with her .  She has 3 children.  They grew up skiing at [place].  No smoking, alcohol, or illicit drugs.     Family History:   Father had MI at 63 and passed away at 70 years old. Mother has hypertension.   No known history of premature coronary artery disease.     Review of Systems: A complete 14-point review of systems is negative, except as noted in the HPI.    Exam:  Objective    Vitals:    02/14/25 0748   BP: 120/74   Pulse: 74   SpO2: 97%     Constitutional: Appears comfortable.   Eyes: No icterus.   ENT: Deferred. Patient wearing a mask.  Neck: No jugular venous distention.   Vascular: No carotid bruits.  Cardiac: Normal S1 and S2, regular rate and rhythm. No murmurs, rubs, or gallops appreciated.  Lungs: Clear to auscultation bilaterally.   GI: Nondistended.  Extremities: Warm. No lower extremity edema.   Skin: Dry.   Neurologic: Awake, alert, oriented.    Psychiatric: No agitation.    Weight:  Wt Readings from Last 3 Encounters:   02/14/25 64 kg (141 lb)   02/09/24 68.5 kg (151 lb)   01/27/23 68.5 kg (151 lb)     Lipid panel: I personally reviewed the patient's previous lipid results.  They were discussed with the patient.    4/2024:  Glucose 154  LFTs within normal limits  , , HDL 74,   TSH within normal limits  Hemoglobin A1c 6.8%    1/2025:  Hemoglobin A1c 6.3%    12/2022:  , TG 50, HDL, 86,   ApoB 102  Lp(a) <15  Borderline production and high absorption  hsCRP 4.2  A1c 7.2%  LDL-P 1475, LDL size 20.8  Fatty acide index 9.28    7/2022:  TSH within normal limits    5/2022:  Sodium 137, potassium 4.2, BUN 23, creatinine 1.22    3/3/2022:  Sodium 138, potassium 4.0, BUN 20, creatinine 1.14  LFTs within normal limits  , TG 67, HDL 67, LDL  190  Hemoglobin A1c 6.1%    11/2021:  Hemoglobin A1c 6.7%    7/2021:  , TG 50, HDL 72,     ECG: Tracing from today personally reviewed and discussed with the patient. Reveals sinus rhythm.    Pertinent Studies:  CAC, 12/2022: Composite 0, aorta unremarkable.      Impressions and Recommendations:     Hypercholesterolemia:  Today I again reviewed that by guidelines statin therapy should be prescribed to reduce her long-term cardiovascular risk.  This was again my recommendation.  Understanding the risk/benefit, she declines today.  We will therefore plan to repeat a calcium score before her follow-up visit.  We also discussed the utility of carotid ultrasound to look for subclinical carotid atherosclerosis.  She would like to proceed.  This test was ordered.  She continues to work on lifestyle modification.  Will repeat a lipid profile before her follow-up visit.    Diabetes:  She will follow-up with you for continued management.  Lipid-lowering as above.    Hypothyroidism:  She continues to follow with you for management.    Chronic kidney disease:  While she carries this diagnosis, her GFR was normal on her most recent labs.    High heart rate:  I suspect this is related to relative deconditioning as she does not experience the sensation other than with strenuous activity.  No palpitations.  She is in sinus rhythm today.  I offered an ambulatory monitor though she prefers to monitor clinically as she works on reconditioning.  She will contact me with progressive or new symptoms.    It was my pleasure to visit with Froy Saldaña in clinic today.  She will follow-up with me in 12 months.  Please do not hesitate to contact me with any questions.      Sincerely,         ____________  Michael Hare MD    .